# Patient Record
Sex: MALE | Race: WHITE | URBAN - METROPOLITAN AREA
[De-identification: names, ages, dates, MRNs, and addresses within clinical notes are randomized per-mention and may not be internally consistent; named-entity substitution may affect disease eponyms.]

---

## 2021-06-01 ENCOUNTER — RECORDS - HEALTHEAST (OUTPATIENT)
Dept: ADMINISTRATIVE | Facility: CLINIC | Age: 61
End: 2021-06-01

## 2025-06-01 ENCOUNTER — APPOINTMENT (OUTPATIENT)
Dept: CT IMAGING | Facility: HOSPITAL | Age: 65
End: 2025-06-01
Attending: EMERGENCY MEDICINE
Payer: COMMERCIAL

## 2025-06-01 ENCOUNTER — HOSPITAL ENCOUNTER (EMERGENCY)
Facility: HOSPITAL | Age: 65
Discharge: HOME | End: 2025-06-01
Attending: EMERGENCY MEDICINE | Admitting: EMERGENCY MEDICINE
Payer: COMMERCIAL

## 2025-06-01 VITALS
DIASTOLIC BLOOD PRESSURE: 77 MMHG | WEIGHT: 200 LBS | SYSTOLIC BLOOD PRESSURE: 120 MMHG | OXYGEN SATURATION: 98 % | HEIGHT: 72 IN | RESPIRATION RATE: 16 BRPM | HEART RATE: 65 BPM | BODY MASS INDEX: 27.09 KG/M2 | TEMPERATURE: 98 F

## 2025-06-01 DIAGNOSIS — N13.2 HYDRONEPHROSIS WITH URINARY OBSTRUCTION DUE TO URETERAL CALCULUS: Primary | ICD-10-CM

## 2025-06-01 DIAGNOSIS — R10.9 FLANK PAIN: ICD-10-CM

## 2025-06-01 PROBLEM — I25.10 NONOCCLUSIVE CORONARY ATHEROSCLEROSIS OF NATIVE CORONARY ARTERY: Status: ACTIVE | Noted: 2020-03-21

## 2025-06-01 PROBLEM — I10 ESSENTIAL HYPERTENSION: Status: ACTIVE | Noted: 2018-11-05

## 2025-06-01 LAB
ALBUMIN UR-MCNC: NEGATIVE MG/DL
ANION GAP SERPL CALCULATED.3IONS-SCNC: 13 MMOL/L (ref 7–15)
APPEARANCE UR: CLEAR
BASOPHILS # BLD AUTO: 0 10E3/UL (ref 0–0.2)
BASOPHILS NFR BLD AUTO: 0 %
BILIRUB UR QL STRIP: NEGATIVE
BUN SERPL-MCNC: 14.8 MG/DL (ref 8–23)
CALCIUM SERPL-MCNC: 10.1 MG/DL (ref 8.8–10.4)
CHLORIDE SERPL-SCNC: 102 MMOL/L (ref 98–107)
COLOR UR AUTO: ABNORMAL
CREAT SERPL-MCNC: 1.18 MG/DL (ref 0.67–1.17)
EGFRCR SERPLBLD CKD-EPI 2021: 69 ML/MIN/1.73M2
EOSINOPHIL # BLD AUTO: 0.2 10E3/UL (ref 0–0.7)
EOSINOPHIL NFR BLD AUTO: 2 %
ERYTHROCYTE [DISTWIDTH] IN BLOOD BY AUTOMATED COUNT: 12.3 % (ref 10–15)
GLUCOSE SERPL-MCNC: 118 MG/DL (ref 70–99)
GLUCOSE UR STRIP-MCNC: NEGATIVE MG/DL
HCO3 SERPL-SCNC: 23 MMOL/L (ref 22–29)
HCT VFR BLD AUTO: 42.5 % (ref 40–53)
HGB BLD-MCNC: 14.5 G/DL (ref 13.3–17.7)
HGB UR QL STRIP: ABNORMAL
IMM GRANULOCYTES # BLD: 0 10E3/UL
IMM GRANULOCYTES NFR BLD: 0 %
KETONES UR STRIP-MCNC: NEGATIVE MG/DL
LEUKOCYTE ESTERASE UR QL STRIP: NEGATIVE
LYMPHOCYTES # BLD AUTO: 1.9 10E3/UL (ref 0.8–5.3)
LYMPHOCYTES NFR BLD AUTO: 18 %
MCH RBC QN AUTO: 30.2 PG (ref 26.5–33)
MCHC RBC AUTO-ENTMCNC: 34.1 G/DL (ref 31.5–36.5)
MCV RBC AUTO: 89 FL (ref 78–100)
MONOCYTES # BLD AUTO: 1.2 10E3/UL (ref 0–1.3)
MONOCYTES NFR BLD AUTO: 12 %
MUCOUS THREADS #/AREA URNS LPF: PRESENT /LPF
NEUTROPHILS # BLD AUTO: 6.9 10E3/UL (ref 1.6–8.3)
NEUTROPHILS NFR BLD AUTO: 67 %
NITRATE UR QL: NEGATIVE
NRBC # BLD AUTO: 0 10E3/UL
NRBC BLD AUTO-RTO: 0 /100
PH UR STRIP: 5.5 [PH] (ref 5–7)
PLATELET # BLD AUTO: 302 10E3/UL (ref 150–450)
POTASSIUM SERPL-SCNC: 4.5 MMOL/L (ref 3.4–5.3)
RBC # BLD AUTO: 4.8 10E6/UL (ref 4.4–5.9)
RBC URINE: <1 /HPF
SODIUM SERPL-SCNC: 138 MMOL/L (ref 135–145)
SP GR UR STRIP: 1.01 (ref 1–1.03)
UROBILINOGEN UR STRIP-MCNC: NORMAL MG/DL
WBC # BLD AUTO: 10.2 10E3/UL (ref 4–11)
WBC URINE: <1 /HPF

## 2025-06-01 PROCEDURE — 250N000013 HC RX MED GY IP 250 OP 250 PS 637: Performed by: EMERGENCY MEDICINE

## 2025-06-01 PROCEDURE — 80048 BASIC METABOLIC PNL TOTAL CA: CPT | Performed by: EMERGENCY MEDICINE

## 2025-06-01 PROCEDURE — 99285 EMERGENCY DEPT VISIT HI MDM: CPT | Mod: 25

## 2025-06-01 PROCEDURE — 96375 TX/PRO/DX INJ NEW DRUG ADDON: CPT

## 2025-06-01 PROCEDURE — 74176 CT ABD & PELVIS W/O CONTRAST: CPT

## 2025-06-01 PROCEDURE — 81001 URINALYSIS AUTO W/SCOPE: CPT | Performed by: EMERGENCY MEDICINE

## 2025-06-01 PROCEDURE — 250N000011 HC RX IP 250 OP 636: Performed by: EMERGENCY MEDICINE

## 2025-06-01 PROCEDURE — 85025 COMPLETE CBC W/AUTO DIFF WBC: CPT | Performed by: EMERGENCY MEDICINE

## 2025-06-01 PROCEDURE — 36415 COLL VENOUS BLD VENIPUNCTURE: CPT | Performed by: EMERGENCY MEDICINE

## 2025-06-01 PROCEDURE — 96374 THER/PROPH/DIAG INJ IV PUSH: CPT

## 2025-06-01 RX ORDER — KETOROLAC TROMETHAMINE 15 MG/ML
15 INJECTION, SOLUTION INTRAMUSCULAR; INTRAVENOUS ONCE
Status: COMPLETED | OUTPATIENT
Start: 2025-06-01 | End: 2025-06-01

## 2025-06-01 RX ORDER — ACETAMINOPHEN 325 MG/1
975 TABLET ORAL ONCE
Status: COMPLETED | OUTPATIENT
Start: 2025-06-01 | End: 2025-06-01

## 2025-06-01 RX ORDER — DIMENHYDRINATE 50 MG
50 TABLET ORAL AT BEDTIME
Qty: 7 TABLET | Refills: 0 | Status: SHIPPED | OUTPATIENT
Start: 2025-06-01 | End: 2025-06-08

## 2025-06-01 RX ORDER — ONDANSETRON 4 MG/1
4 TABLET, ORALLY DISINTEGRATING ORAL EVERY 8 HOURS PRN
Qty: 12 TABLET | Refills: 0 | Status: SHIPPED | OUTPATIENT
Start: 2025-06-01 | End: 2025-06-05

## 2025-06-01 RX ORDER — OXYCODONE HYDROCHLORIDE 5 MG/1
5 TABLET ORAL EVERY 4 HOURS PRN
Qty: 12 TABLET | Refills: 0 | Status: SHIPPED | OUTPATIENT
Start: 2025-06-01 | End: 2025-06-05

## 2025-06-01 RX ORDER — IBUPROFEN 200 MG
400 TABLET ORAL EVERY 6 HOURS
Qty: 56 TABLET | Refills: 0 | Status: SHIPPED | OUTPATIENT
Start: 2025-06-01 | End: 2025-06-08

## 2025-06-01 RX ORDER — ACETAMINOPHEN 500 MG
1000 TABLET ORAL EVERY 6 HOURS
Qty: 56 TABLET | Refills: 0 | Status: SHIPPED | OUTPATIENT
Start: 2025-06-01 | End: 2025-06-08

## 2025-06-01 RX ORDER — DIMENHYDRINATE 50 MG
50 TABLET ORAL EVERY 6 HOURS PRN
Qty: 28 TABLET | Refills: 0 | Status: SHIPPED | OUTPATIENT
Start: 2025-06-01 | End: 2025-06-08

## 2025-06-01 RX ORDER — ONDANSETRON 2 MG/ML
4 INJECTION INTRAMUSCULAR; INTRAVENOUS ONCE
Status: COMPLETED | OUTPATIENT
Start: 2025-06-01 | End: 2025-06-01

## 2025-06-01 RX ADMIN — Medication 50 MG: at 20:37

## 2025-06-01 RX ADMIN — KETOROLAC TROMETHAMINE 15 MG: 15 INJECTION, SOLUTION INTRAMUSCULAR; INTRAVENOUS at 20:38

## 2025-06-01 RX ADMIN — ONDANSETRON 4 MG: 2 INJECTION, SOLUTION INTRAMUSCULAR; INTRAVENOUS at 20:39

## 2025-06-01 RX ADMIN — ACETAMINOPHEN 975 MG: 325 TABLET ORAL at 20:37

## 2025-06-01 ASSESSMENT — ACTIVITIES OF DAILY LIVING (ADL)
ADLS_ACUITY_SCORE: 41
ADLS_ACUITY_SCORE: 41

## 2025-06-01 ASSESSMENT — COLUMBIA-SUICIDE SEVERITY RATING SCALE - C-SSRS
1. IN THE PAST MONTH, HAVE YOU WISHED YOU WERE DEAD OR WISHED YOU COULD GO TO SLEEP AND NOT WAKE UP?: NO
2. HAVE YOU ACTUALLY HAD ANY THOUGHTS OF KILLING YOURSELF IN THE PAST MONTH?: NO
6. HAVE YOU EVER DONE ANYTHING, STARTED TO DO ANYTHING, OR PREPARED TO DO ANYTHING TO END YOUR LIFE?: NO

## 2025-06-01 NOTE — ED TRIAGE NOTES
Patient is here with left flank pain that started last evening with difficultly urinating. Known kidney stones and saw urology who said they would monitor.      Triage Assessment (Adult)       Row Name 06/01/25 3311          Triage Assessment    Airway WDL WDL        Respiratory WDL    Respiratory WDL WDL        Skin Circulation/Temperature WDL    Skin Circulation/Temperature WDL WDL        Cardiac WDL    Cardiac WDL WDL        Peripheral/Neurovascular WDL    Peripheral Neurovascular WDL WDL        Cognitive/Neuro/Behavioral WDL    Cognitive/Neuro/Behavioral WDL WDL

## 2025-06-02 NOTE — DISCHARGE INSTRUCTIONS
You were seen in the Emergency Department today for evaluation of flank pain.  Your lab work showed no cause of your symptoms. Your imaging studies showed a stone in your left ureter. You were prescribed oxycodone, Zofran, Tylenol, ibuprofen, and Dramamine. You should take all medications as prescribed.  Follow up with your primary care physician to ensure resolution of symptoms. Return if you have new or worsening symptoms.

## 2025-06-02 NOTE — ED PROVIDER NOTES
EMERGENCY DEPARTMENT ENCOUNTER      NAME: Neal Zarate  AGE: 64 year old male  YOB: 1960  MRN: [id number]  EVALUATION DATE & TIME: 6/1/2025  6:24 PM    PCP: Anup Lock    ED PROVIDER: Tiffanie Frederick M.D.      Chief Complaint   Patient presents with    Abdominal Pain         FINAL IMPRESSION:  1. Hydronephrosis with urinary obstruction due to ureteral calculus    2. Flank pain        ED COURSE & MEDICAL DECISION MAKING:    Pertinent Labs & Imaging studies reviewed. (See chart for details)  ED Course as of 06/01/25 2128   Sun Jun 01, 2025 1920 Patient is a 64-year-old male comes in today for evaluation of some left back pain that started last night.  He said he went to bed after eating some ice cream and started having pain in the night.  He had been in urgent care few weeks ago and was diagnosed with diverticulitis and does not know what that feels similar.  He said a history of stones in the kidney before but never passed any.  He had some nausea last night but no vomiting.  He denies any fever.  His pain has improved significantly since arrival with no intervention.  On my exam he has no abdominal tenderness and no flank tenderness.  With the lack of tenderness my suspicion for right diverticulitis is pretty low.  I suspect that one of his stones has moved.  I think a noncontrast study is appropriate.  His urine does not show blood but symptoms are most consistent with a stone so we will do a noncontrasted study and see what we find.  Since he is not having a lot of pain right now we can hold off on any pain medications at this time.  Him and his wife are in agreement with the plan.   2007 CT scan shows left-sided ureteral stone with some obstruction.   2026 Patient stone measures 7 x 5 mm.  There is a good chance that he would need intervention on this.  Since his pain is well-controlled now I think he can discharge home and follow-up with urology as an outpatient.   2034 I checked  back in with the patient.  Pain starting to come back a little bit so I did order Tylenol and Toradol and dimenhydrinate and some Zofran for him.  I will work on getting him prescription to go home and a referral for urology as an outpatient.  He is comfortable with that plan.       Medical Decision Making    History:  Supplemental history from or  use: Helps provide a little bit of the history  External Record(s) reviewed: I reviewed patient's clinic visit from 5/21/2025 with urology.  Patient had been having some lower urinary tract symptoms and they thought it was related to BPH or detrusor dysfunction or other cause.  They discussed increasing Flomax or switching him to finasteride or considering a procedure.  Patient was also found to have kidney stones that was seen on recent CT scan.  There was an 8 mm stone in the left kidney.  They discussed the options including observation versus intervention.  He is currently asymptomatic and since is nonobstructive the plan for observation for now.    Work Up:  Emergent/Severe conditions considered and evaluated for: Electrolyte abnormality, hyperglycemia, hypoglycemia, acidosis, anemia, thrombocytopenia, renal failure, dehydration, cholecystitis, hepatitis, pancreatitis, appendicitis, bowel obstruction, renal stone, hernia, ischemic bowel, aortic aneurysm, perforated viscus.  UTI  I independently reviewed and interpreted CT abdomen pelvis which showed a left-sided ureteral stone with obstruction. See full radiology report for all details.   In addition to work up documented, I considered the following work up: None  Medications given that require intensive monitoring for toxicity: None    External consultation:  Discussion of management with another provider: None    Complicating factors:  Care impacted by chronic illness: Coronary arteries, BPH, hypertension, anxiety    Disposition considerations: Considered admission to the hospital but work up came back  reassuring and patient was able to discharge home.    Prescriptions considered/prescribed: Oxycodone, Zofran    MIPS (CTA, Dental pain, Quintero, Sinusitis, Asthma/COPD, Head Trauma)    At the conclusion of the encounter I discussed  the results of all of the tests and the disposition with patient.   All questions were answered.  The patient acknowledged understanding and was involved in the decision making regarding the overall care plan.      I discussed with patient the utility, limitations and findings of the exam/interventions/studies done during this visit as well as the list of differential diagnosis and symptoms to monitor/return to ER for.  Additional verbal discharge instructions were provided.     MEDICATIONS GIVEN IN THE EMERGENCY:  Medications   ondansetron (ZOFRAN) injection 4 mg (4 mg Intravenous $Given 6/1/25 2039)   dimenhyDRINATE chew tab 50 mg (50 mg Oral $Given 6/1/25 2037)   ketorolac (TORADOL) injection 15 mg (15 mg Intravenous $Given 6/1/25 2038)   acetaminophen (TYLENOL) tablet 975 mg (975 mg Oral $Given 6/1/25 2037)       NEW PRESCRIPTIONS STARTED AT TODAY'S ER VISIT  Discharge Medication List as of 6/1/2025  8:51 PM        START taking these medications    Details   acetaminophen (TYLENOL) 500 MG tablet Take 2 tablets (1,000 mg) by mouth every 6 hours for 7 days., Disp-56 tablet, R-0, E-Prescribe      !! dimenhyDRINATE (DRAMAMINE) 50 MG tablet Take 1 tablet (50 mg) by mouth at bedtime for 7 days., Disp-7 tablet, R-0, E-Prescribe      !! dimenhyDRINATE (DRAMAMINE) 50 MG tablet Take 1 tablet (50 mg) by mouth every 6 hours as needed for other (kidney stone pain management)., Disp-28 tablet, R-0, E-Prescribe      ibuprofen (ADVIL/MOTRIN) 200 MG tablet Take 2 tablets (400 mg) by mouth every 6 hours for 7 days., Disp-56 tablet, R-0, E-Prescribe      ondansetron (ZOFRAN ODT) 4 MG ODT tab Take 1 tablet (4 mg) by mouth every 8 hours as needed for nausea., Disp-12 tablet, R-0, E-Prescribe      oxyCODONE  (ROXICODONE) 5 MG tablet Take 1 tablet (5 mg) by mouth every 4 hours as needed for severe pain. If pain is not improved with acetaminophen and ibuprofen., Disp-12 tablet, R-0, E-Prescribe       !! - Potential duplicate medications found. Please discuss with provider.             =================================================================    HPI    Triage Note: Patient is here with left flank pain that started last evening with difficultly urinating. Known kidney stones and saw urology who said they would monitor.      Triage Assessment (Adult)       Row Name 06/01/25 1758          Triage Assessment    Airway WDL WDL        Respiratory WDL    Respiratory WDL WDL        Skin Circulation/Temperature WDL    Skin Circulation/Temperature WDL WDL        Cardiac WDL    Cardiac WDL WDL        Peripheral/Neurovascular WDL    Peripheral Neurovascular WDL WDL        Cognitive/Neuro/Behavioral WDL    Cognitive/Neuro/Behavioral WDL WDL                   Neal Zarate is a 64 year old male who presents for evaluation of left flank pain that started last night.    PAST MEDICAL HISTORY:  No past medical history on file.    PAST SURGICAL HISTORY:  No past surgical history on file.    CURRENT MEDICATIONS:    No current facility-administered medications for this encounter.    Current Outpatient Medications:     acetaminophen (TYLENOL) 500 MG tablet, Take 2 tablets (1,000 mg) by mouth every 6 hours for 7 days., Disp: 56 tablet, Rfl: 0    dimenhyDRINATE (DRAMAMINE) 50 MG tablet, Take 1 tablet (50 mg) by mouth at bedtime for 7 days., Disp: 7 tablet, Rfl: 0    dimenhyDRINATE (DRAMAMINE) 50 MG tablet, Take 1 tablet (50 mg) by mouth every 6 hours as needed for other (kidney stone pain management)., Disp: 28 tablet, Rfl: 0    ibuprofen (ADVIL/MOTRIN) 200 MG tablet, Take 2 tablets (400 mg) by mouth every 6 hours for 7 days., Disp: 56 tablet, Rfl: 0    ondansetron (ZOFRAN ODT) 4 MG ODT tab, Take 1 tablet (4 mg) by mouth every 8 hours as  needed for nausea., Disp: 12 tablet, Rfl: 0    oxyCODONE (ROXICODONE) 5 MG tablet, Take 1 tablet (5 mg) by mouth every 4 hours as needed for severe pain. If pain is not improved with acetaminophen and ibuprofen., Disp: 12 tablet, Rfl: 0    ALLERGIES:  No Known Allergies    FAMILY HISTORY:  No family history on file.    SOCIAL HISTORY:   Social History     Socioeconomic History    Marital status: Single     Social Drivers of Health     Food Insecurity: No Food Insecurity (4/25/2025)    Received from [facility]    Hunger Vital Sign     Worried About Running Out of Food in the Last Year: Never true     Ran Out of Food in the Last Year: Never true   Transportation Needs: No Transportation Needs (4/25/2025)    Received from [facility]    PRAPARE - Transportation     Lack of Transportation (Medical): No     Lack of Transportation (Non-Medical): No   Housing Stability: Low Risk  (4/25/2025)    Received from [facility]    Housing Stability Vital Sign     Unable to Pay for Housing in the Last Year: No     Number of Times Moved in the Last Year: 0     Homeless in the Last Year: No       PHYSICAL EXAM    VITAL SIGNS: /77   Pulse 65   Temp 98  F (36.7  C)   Resp 16   Ht 1.829 m (6')   Wt 90.7 kg (200 lb)   SpO2 98%   BMI 27.12 kg/m     GENERAL: Awake, alert, answering questions appropriately, no acute distress  SPEECH:  Easy to understand speech, Normal volume and davey  PULMONARY: No respiratory distress, Lungs clear to auscultation bilaterally  CARDIOVASCULAR: Regular rate and rhythm, Distal pulses present and normal.  ABDOMINAL: Soft, Nondistended, Nontender, No rebound or guarding, No palpable masses  EXTREMITIES: No lower extremity edema.  PSYCH: Normal mood and affect     LAB:  All pertinent labs reviewed and interpreted.  Results for orders placed or performed during the hospital encounter of 06/01/25   CT Abdomen Pelvis w/o Contrast    Impression    IMPRESSION:   1.  New left hydronephrosis  secondary to an obstructing 7 x 5 mm left UPJ stone.  2.  Numerous bladder stones similar to previous study.     UA with Microscopic reflex to Culture    Specimen: Urine, Midstream   Result Value Ref Range    Color Urine Light Yellow Colorless, Straw, Light Yellow, Yellow    Appearance Urine Clear Clear    Glucose Urine Negative Negative mg/dL    Bilirubin Urine Negative Negative    Ketones Urine Negative Negative mg/dL    Specific Gravity Urine 1.012 1.001 - 1.030    Blood Urine 0.03 mg/dL (A) Negative    pH Urine 5.5 5.0 - 7.0    Protein Albumin Urine Negative Negative mg/dL    Urobilinogen Urine Normal Normal mg/dL    Nitrite Urine Negative Negative    Leukocyte Esterase Urine Negative Negative    Mucus Urine Present (A) None Seen /LPF    RBC Urine <1 <=2 /HPF    WBC Urine <1 <=5 /HPF   Basic metabolic panel   Result Value Ref Range    Sodium 138 135 - 145 mmol/L    Potassium 4.5 3.4 - 5.3 mmol/L    Chloride 102 98 - 107 mmol/L    Carbon Dioxide (CO2) 23 22 - 29 mmol/L    Anion Gap 13 7 - 15 mmol/L    Urea Nitrogen 14.8 8.0 - 23.0 mg/dL    Creatinine 1.18 (H) 0.67 - 1.17 mg/dL    GFR Estimate 69 >60 mL/min/1.73m2    Calcium 10.1 8.8 - 10.4 mg/dL    Glucose 118 (H) 70 - 99 mg/dL   CBC with platelets and differential   Result Value Ref Range    WBC Count 10.2 4.0 - 11.0 10e3/uL    RBC Count 4.80 4.40 - 5.90 10e6/uL    Hemoglobin 14.5 13.3 - 17.7 g/dL    Hematocrit 42.5 40.0 - 53.0 %    MCV 89 78 - 100 fL    MCH 30.2 26.5 - 33.0 pg    MCHC 34.1 31.5 - 36.5 g/dL    RDW 12.3 10.0 - 15.0 %    Platelet Count 302 150 - 450 10e3/uL    % Neutrophils 67 %    % Lymphocytes 18 %    % Monocytes 12 %    % Eosinophils 2 %    % Basophils 0 %    % Immature Granulocytes 0 %    NRBCs per 100 WBC 0 <1 /100    Absolute Neutrophils 6.9 1.6 - 8.3 10e3/uL    Absolute Lymphocytes 1.9 0.8 - 5.3 10e3/uL    Absolute Monocytes 1.2 0.0 - 1.3 10e3/uL    Absolute Eosinophils 0.2 0.0 - 0.7 10e3/uL    Absolute Basophils 0.0 0.0 - 0.2 10e3/uL     Absolute Immature Granulocytes 0.0 <=0.4 10e3/uL    Absolute NRBCs 0.0 10e3/uL       RADIOLOGY:  CT Abdomen Pelvis w/o Contrast   Final Result   IMPRESSION:    1.  New left hydronephrosis secondary to an obstructing 7 x 5 mm left UPJ stone.   2.  Numerous bladder stones similar to previous study.               Tiffanie Frederick M.D.  Emergency Medicine  [facility] EMERGENCY DEPARTMENT  [facility] [facility] [id number] [phone]  Dept: [phone]       Tiffanie Frederick MD  06/01/25 3067

## 2025-07-19 ENCOUNTER — HEALTH MAINTENANCE LETTER (OUTPATIENT)
Age: 65
End: 2025-07-19